# Patient Record
Sex: FEMALE | Race: AMERICAN INDIAN OR ALASKA NATIVE
[De-identification: names, ages, dates, MRNs, and addresses within clinical notes are randomized per-mention and may not be internally consistent; named-entity substitution may affect disease eponyms.]

---

## 2020-04-06 ENCOUNTER — HOSPITAL ENCOUNTER (EMERGENCY)
Dept: HOSPITAL 5 - ED | Age: 40
Discharge: HOME | End: 2020-04-06
Payer: COMMERCIAL

## 2020-04-06 VITALS — DIASTOLIC BLOOD PRESSURE: 101 MMHG | SYSTOLIC BLOOD PRESSURE: 156 MMHG
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
## 2020-04-06 DIAGNOSIS — F07.81: ICD-10-CM
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
## 2020-04-06 DIAGNOSIS — S09.90XA: Primary | ICD-10-CM

## 2020-04-06 DIAGNOSIS — G44.319: ICD-10-CM
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
## 2020-04-06 PROCEDURE — 99281 EMR DPT VST MAYX REQ PHY/QHP: CPT

## 2020-04-06 NOTE — EMERGENCY DEPARTMENT REPORT
ED Head Trauma HPI





- General


Chief complaint: Head Injury


Stated complaint: HEADACHE, DIZZY,


Time Seen by Provider: 04/06/20 16:34


Source: patient


Mode of arrival: Ambulatory


Limitations: No Limitations





- History of Present Illness


MD Complaint: head injury


-: Last night (Injury occurred between 6 and 7 PM last night at a grocery store)


Mechanism of Injury: other (Was opening the freezer in a grocery store and a 

light fell down from inside of the freezer striking her in the right side of her

forehead resulting in pain)


Location: frontal


Loss of Consciousness: no


Radiation: other (Pain radiates to both sides of the head and a dull throbbing 

fashion)


Severity: mild


Quality: dull


Consistency: constant


Context: other (Patient reports no anticoagulation utilization)


Associated Symptoms: nausea (Had some nausea this morning which subsided after 

she ate breakfast).  denies: amnesia, repetitive questioning, vision changes, 

weakness, tingling, neck pain





- Related Data


                                  Previous Rx's











 Medication  Instructions  Recorded  Last Taken  Type


 


Butalb/Acetamin/Caff -40 1 each PO Q6HR PRN #14 tablet 04/06/20 Unknown Rx





[Fioricet -40]    











Allergies/Adverse reactions: 


                                    Allergies











Allergy/AdvReac Type Severity Reaction Status Date / Time


 


No Known Allergies Allergy   Verified 04/06/20 15:50














ED Review of Systems


ROS: 


Stated complaint: HEADACHE, DIZZY,


Other details as noted in HPI





Comment: All other systems reviewed and negative





ED Past Medical Hx





- Past Medical History


Previous Medical History?: No





- Surgical History


Past Surgical History?: No





- Social History


Smoking Status: Never Smoker





- Medications


Home Medications: 


                                Home Medications











 Medication  Instructions  Recorded  Confirmed  Last Taken  Type


 


Butalb/Acetamin/Caff -40 1 each PO Q6HR PRN #14 tablet 04/06/20  Unknown 

Rx





[Fioricet -40]     














ED Physical Exam





- General


Limitations: No Limitations


General appearance: alert, in no apparent distress





- Head


Head exam: Present: atraumatic, normocephalic





- Eye


Eye exam: Present: normal appearance, PERRL, EOMI, other (Negative funduscopic 

examination).  Absent: nystagmus


Pupils: Present: normal accommodation





- ENT


ENT exam: Present: normal exam, normal orophraynx, mucous membranes moist, TM's 

normal bilaterally





- Neck


Neck exam: Present: normal inspection, full ROM





- Respiratory


Respiratory exam: Present: normal lung sounds bilaterally.  Absent: respiratory 

distress, wheezes, rales, rhonchi, chest wall tenderness, accessory muscle use





- Cardiovascular


Cardiovascular Exam: Present: regular rate, normal rhythm, normal heart sounds. 

Absent: bradycardia, tachycardia, systolic murmur, diastolic murmur, rubs, 

gallop





- GI/Abdominal


GI/Abdominal exam: Present: soft, normal bowel sounds





- Extremities Exam


Extremities exam: Present: normal inspection, full ROM





- Back Exam


Back exam: Present: normal inspection





- Neurological Exam


Neurological exam: Present: alert, oriented X3, CN II-XII intact, normal gait, 

other (Sensation intact, Romberg is negative, normal finger-to-nose, normal 

heel-to-shin, normal speech pattern).  Absent: abnormal gait, motor sensory 

deficit





- Psychiatric


Psychiatric exam: Present: normal affect, normal mood.  Absent: depressed, 

agitated, manic, homicidal ideation, suicidal ideation





- Skin


Skin exam: Present: warm, dry, intact, normal color.  Absent: rash





ED Course





                                   Vital Signs











  04/06/20





  15:47


 


Temperature 99 F


 


Pulse Rate 80


 


Respiratory 16





Rate 


 


Blood Pressure 156/101


 


O2 Sat by Pulse 99





Oximetry 














- Medical Decision Making





Hugo coma scale 15. No hematoma. No skull crepitance or stepoff. No Ramirez 

sign. No raccoon eyes. No fluid from nose or ears. No nasal septal hematoma. No 

open wounds. No cervical spine tenderness. Risks of CT radiation far outweigh 

any risks of intracranial hemorrhage. Given instructions regarding supportive 

care including pain meds as needed, return precautions, follow-up with primary 

physician.  [place] CT rule score 0 had a long conversation with Ms. Love on 

when to return to emergency department.  Patient states she did not need any 

pain medications plans on going and getting something over-the-counter.  Also 

states she will be to follow-up with her primary care provider should her 

postconcussive symptoms persist and plans on following up with her doctor either

later this week or next week.  CT scan was offered but she declined


Critical care attestation.: 


If time is entered above; I have spent that time in minutes in the direct care 

of this critically ill patient, excluding procedure time.








ED Disposition


Clinical Impression: 


 Head injury due to trauma, Post concussion syndrome





Disposition: DC-01 TO HOME OR SELFCARE


Is pt being admited?: No


Does the pt Need Aspirin: No


Condition: Stable


Instructions:  Minor Head Injury (ED), Post Concussion Syndrome (ED), Concussion

(ED)


Prescriptions: 


Butalb/Acetamin/Caff -40 [Fioricet -40] 1 each PO Q6HR PRN #14 

tablet


 PRN Reason: Headache


Referrals: 


PRIMARY CARE,MD [Primary Care Provider] - 3-5 Days

## 2020-05-21 ENCOUNTER — HOSPITAL ENCOUNTER (EMERGENCY)
Dept: HOSPITAL 5 - ED | Age: 40
Discharge: LEFT BEFORE DISCHARGE | End: 2020-05-21
Payer: COMMERCIAL

## 2020-05-21 VITALS — SYSTOLIC BLOOD PRESSURE: 155 MMHG | DIASTOLIC BLOOD PRESSURE: 99 MMHG

## 2020-05-21 DIAGNOSIS — Z00.00: Primary | ICD-10-CM

## 2020-05-21 DIAGNOSIS — Z53.21: ICD-10-CM
